# Patient Record
Sex: FEMALE | Race: AMERICAN INDIAN OR ALASKA NATIVE | Employment: PART TIME | URBAN - METROPOLITAN AREA
[De-identification: names, ages, dates, MRNs, and addresses within clinical notes are randomized per-mention and may not be internally consistent; named-entity substitution may affect disease eponyms.]

---

## 2018-11-20 ENCOUNTER — OFFICE VISIT (OUTPATIENT)
Dept: ORTHOPEDIC SURGERY | Age: 23
End: 2018-11-20

## 2018-11-20 VITALS
HEIGHT: 63 IN | WEIGHT: 144 LBS | TEMPERATURE: 96.9 F | DIASTOLIC BLOOD PRESSURE: 69 MMHG | SYSTOLIC BLOOD PRESSURE: 112 MMHG | BODY MASS INDEX: 25.52 KG/M2 | RESPIRATION RATE: 16 BRPM | HEART RATE: 76 BPM | OXYGEN SATURATION: 100 %

## 2018-11-20 DIAGNOSIS — M25.561 RIGHT KNEE PAIN, UNSPECIFIED CHRONICITY: ICD-10-CM

## 2018-11-20 DIAGNOSIS — M76.52 PATELLAR TENDINITIS OF BOTH KNEES: Primary | ICD-10-CM

## 2018-11-20 DIAGNOSIS — M76.51 PATELLAR TENDINITIS OF BOTH KNEES: Primary | ICD-10-CM

## 2018-11-20 DIAGNOSIS — M25.562 LEFT KNEE PAIN, UNSPECIFIED CHRONICITY: ICD-10-CM

## 2018-11-20 RX ORDER — MELOXICAM 15 MG/1
15 TABLET ORAL
Qty: 30 TAB | Refills: 1 | Status: SHIPPED | OUTPATIENT
Start: 2018-11-20 | End: 2019-01-17 | Stop reason: SDUPTHER

## 2018-11-20 NOTE — PROGRESS NOTES
Damaris Buchanan 1995 Chief Complaint Patient presents with  Knee Pain B/L knee pain HISTORY OF PRESENT ILLNESS 
Damaris Buchanan is a 26 y.o. female who presents today for evaluation of b/l knee pain. She rates her pain 0/10 today. Pain has been present for awhile. She used to participate in marching band, and had pain with squatting and kneeling. She had a h/o of hip pain. She participates on a dance team and had soreness after activity. Patient describes the pain as aching that is Intermittent in nature. Symptoms are worse with prolonged sitting, squatting, steps, walking and standing, Activity and is better with  Rest. Associated symptoms include stiffness. Since problem started, it: has improved. Pain does not wake patient up at night. Has taken NSAIDS for the problem. Has tried following treatments: Injections:NO; Brace:NO; Therapy:NO; Cane/Crutch:NO Allergies Allergen Reactions  Latex, Natural Rubber Rash History reviewed. No pertinent past medical history. Social History Socioeconomic History  Marital status: SINGLE Spouse name: Not on file  Number of children: Not on file  Years of education: Not on file  Highest education level: Not on file Social Needs  Financial resource strain: Not on file  Food insecurity - worry: Not on file  Food insecurity - inability: Not on file  Transportation needs - medical: Not on file  Transportation needs - non-medical: Not on file Occupational History  Not on file Tobacco Use  Smoking status: Never Smoker  Smokeless tobacco: Never Used Substance and Sexual Activity  Alcohol use: No  
 Drug use: No  
 Sexual activity: Not Currently Other Topics Concern  Not on file Social History Narrative  Not on file Past Surgical History:  
Procedure Laterality Date  HX TONSIL AND ADENOIDECTOMY History reviewed. No pertinent family history. Current Outpatient Medications Medication Sig  ibuprofen (MOTRIN) 600 mg tablet Take 1 Tab by mouth every six (6) hours as needed for Pain.  methocarbamol (ROBAXIN-750) 750 mg tablet Take 1 Tab by mouth six (6) times daily. No current facility-administered medications for this visit. REVIEW OF SYSTEM Patient denies: Weight loss, Fever/Chills, HA, Visual changes, Fatigue, Chest pain, SOB, Abdominal pain, N/V/D/C, Blood in stool or urine, Edema. Pertinent positive as above in HPI. All others were negative PHYSICAL EXAM:  
Visit Vitals /69 (BP 1 Location: Left arm, BP Patient Position: Sitting) Pulse 76 Temp 96.9 °F (36.1 °C) (Oral) Resp 16 Ht 5' 3\" (1.6 m) Wt 144 lb (65.3 kg) LMP 11/06/2018 (Exact Date) SpO2 100% BMI 25.51 kg/m² The patient is a well-developed, well-nourished female   in no acute distress. The patient is alert and oriented times three. The patient is alert and oriented times three. Mood and affect are normal. 
LYMPHATIC: lymph nodes are not enlarged and are within normal limits SKIN: normal in color and non tender to palpation. There are no bruises or abrasions noted. NEUROLOGICAL: Motor sensory exam is within normal limits. Reflexes are equal bilaterally. There is normal sensation to pinprick and light touch MUSCULOSKELETAL: 
Examination Left knee Right knee Skin Intact Intact Range of motion 0-130 0-130 Effusion - - Medial joint line tenderness - - Lateral joint line tenderness - - Tenderness Pes Bursa - - Tenderness insertion MCL - - Tenderness insertion LCL - - Jessicas - - Patella crepitus - - Patella grind - - Lachman - - Pivot shift - - Anterior drawer - - Posterior drawer - - Varus stress - -  
Valgus stress - - Neurovascular Intact Intact Calf Swelling and Tenderness to Palpation - - Dennys's Test - -  
Hamstring Cord Tightness + + IMAGING: XR of b/l knees dated 11/20/18 was reviewed and read: No acute abnormalities IMPRESSION:   
  ICD-10-CM ICD-9-CM 1. Patellar tendinitis of both knees M76.51 726.64 meloxicam (MOBIC) 15 mg tablet M76.52 2. Left knee pain, unspecified chronicity M25.562 719.46 AMB POC XRAY, KNEE; 1/2 VIEWS  
   meloxicam (MOBIC) 15 mg tablet 3. Right knee pain, unspecified chronicity M25.561 719.46 AMB POC XRAY, KNEE; 1/2 VIEWS  
   meloxicam (MOBIC) 15 mg tablet PLAN: 
1. The patient presents today with b/l knee pain due to patellar tendonitis of both knees. Given hamstring stretches today. Risk factors include: n/a 2. No ultrasound exam indicated today 3. No cortisone injection indicated today 4. No Physical/Occupational Therapy indicated today 5. No diagnostic test indicated today: 6. No durable medical equipment indicated today 7. No referral indicated today 8. Yes medications indicated today: MOBIC 9. No Narcotic indicated today RTC prn Follow-up Disposition: Not on File Scribed by Johnny Peres ([address]) as dictated by Jonathon Chavez MD 
 
I, Dr. Jonathon Chavez, confirm that all documentation is accurate.  
 
Jonathon Chavez M.D.  
[facility]

## 2019-01-17 DIAGNOSIS — M76.52 PATELLAR TENDINITIS OF BOTH KNEES: ICD-10-CM

## 2019-01-17 DIAGNOSIS — M76.51 PATELLAR TENDINITIS OF BOTH KNEES: ICD-10-CM

## 2019-01-17 DIAGNOSIS — M25.562 LEFT KNEE PAIN, UNSPECIFIED CHRONICITY: ICD-10-CM

## 2019-01-17 DIAGNOSIS — M25.561 RIGHT KNEE PAIN, UNSPECIFIED CHRONICITY: ICD-10-CM

## 2019-01-18 RX ORDER — MELOXICAM 15 MG/1
TABLET ORAL
Qty: 30 TAB | Refills: 1 | Status: SHIPPED | OUTPATIENT
Start: 2019-01-18 | End: 2019-02-21

## 2021-06-08 PROBLEM — M94.0 COSTAL CHONDRITIS: Status: ACTIVE | Noted: 2018-11-21

## 2021-06-08 PROBLEM — N63.0 BREAST LUMP: Status: ACTIVE | Noted: 2021-06-08

## 2021-06-08 PROBLEM — R92.8 ABNORMAL FINDINGS ON DIAGNOSTIC IMAGING OF BREAST: Status: ACTIVE | Noted: 2021-06-08

## 2021-11-02 ENCOUNTER — OFFICE VISIT (OUTPATIENT)
Dept: CARDIOLOGY CLINIC | Age: 26
End: 2021-11-02
Payer: COMMERCIAL

## 2021-11-02 VITALS
WEIGHT: 165 LBS | BODY MASS INDEX: 28.17 KG/M2 | HEART RATE: 65 BPM | OXYGEN SATURATION: 100 % | HEIGHT: 64 IN | DIASTOLIC BLOOD PRESSURE: 86 MMHG | SYSTOLIC BLOOD PRESSURE: 122 MMHG

## 2021-11-02 DIAGNOSIS — R42 DIZZINESS: ICD-10-CM

## 2021-11-02 DIAGNOSIS — R07.9 CHEST PAIN, UNSPECIFIED TYPE: Primary | ICD-10-CM

## 2021-11-02 PROCEDURE — 93000 ELECTROCARDIOGRAM COMPLETE: CPT | Performed by: INTERNAL MEDICINE

## 2021-11-02 PROCEDURE — 99204 OFFICE O/P NEW MOD 45 MIN: CPT | Performed by: INTERNAL MEDICINE

## 2021-11-02 NOTE — PROGRESS NOTES
Oma Feliciano presents today for   Chief Complaint   Patient presents with    Follow-up     self referred for chest pain     Chest Pain     feels like a chest tightness around her start. its is constant pain is more intense upon exertion        Laurie Lloyd preferred language for health care discussion is english/other. Is someone accompanying this pt? no    Is the patient using any DME equipment during [address]? no    Depression Screening:  3 most recent PHQ Screens 11/2/2021   Little interest or pleasure in doing things Not at all   Feeling down, depressed, irritable, or hopeless Not at all   Total Score PHQ 2 0       Learning Assessment:  Learning Assessment 11/2/2021   PRIMARY LEARNER Patient   HIGHEST LEVEL OF EDUCATION - PRIMARY LEARNER  4 YEARS OF COLLEGE   BARRIERS PRIMARY LEARNER NONE   PRIMARY LANGUAGE ENGLISH   LEARNER PREFERENCE PRIMARY DEMONSTRATION   ANSWERED BY patient   RELATIONSHIP SELF       Abuse Screening:  Abuse Screening Questionnaire 11/2/2021   Do you ever feel afraid of your partner? N   Are you in a relationship with someone who physically or mentally threatens you? N   Is it safe for you to go home? Y       Fall Risk  No flowsheet data found. Pt currently taking Anticoagulant therapy? no    Coordination of Care:  1. Have you been to the ER, urgent care clinic since your last visit? Hospitalized since your last visit? no    2. Have you seen or consulted any other health care providers outside of the [address] [place] [address] since your last visit? Include any pap smears or colon screening.  no

## 2021-11-02 NOTE — PROGRESS NOTES
HISTORY OF PRESENT ILLNESS  Adam Medrano is a 27 y.o. female. ASSESSMENT and PLAN    Ms. Lorena Holt has no prior documented history of CAD. She works as a  at [employer]. In June 2021, she had an episode of left-sided chest discomfort which was quite intense. The pain lasted about 3 days and resolved on its own. In October 2021, she had another episode of intense left-sided chest pain that lasted 3 days. It is also resolved on its own. There is no significant worsening with walking but there was worsening with other physical exertion like upper body movement. There is no associated dyspnea on exertion. She has no family history of early CAD. She denies knowledge of diabetes mellitus, hypertension, or hyperlipidemia. She drinks alcohol once every 1-2 weeks, usually wine. She denies tobacco use. She does smoke marijuana since the age of 23. From cardiac standpoint, she is doing well. Her chest pain is quite atypical for CAD, coronary ischemia. Nevertheless, echocardiogram has been requested to check anatomic definition. Regular stress test has also been requested. Her blood pressure is well controlled. There is no evidence of decompensated CHF noted. Her rhythm remains stable sinus. Her weight today is 165 pounds. If the above testing is unremarkable, I will see her back as needed. All questions were answered. She agrees with the plan. Encounter Diagnoses   Name Primary?  Chest pain, unspecified type Yes    Dizziness      current treatment plan is effective, no change in therapy  lab results and schedule of future lab studies reviewed with patient  reviewed diet, exercise and weight control  very strongly urged to quit smoking to reduce cardiovascular risk      HPI   Today, Ms. Lloyd has complaints of left-sided deep chest discomfort which is constant. She has had this pain since past Saturday, 3 days.   It is without significant changes throughout the day, whether she is walking or resting. She has noticed some worsening with moving her upper body. When she was seen in the emergency room on Saturday, ER doctor palpated the area and she noted worsening pain. She does not have the worsening pain at this time. Review of Systems   Respiratory: Negative for shortness of breath. Cardiovascular: Positive for chest pain. Negative for palpitations, orthopnea, claudication, leg swelling and PND. All other systems reviewed and are negative. Physical Exam  Vitals and nursing note reviewed. Constitutional:       Appearance: Normal appearance. HENT:      Head: Normocephalic. Eyes:      Conjunctiva/sclera: Conjunctivae normal.   Neck:      Vascular: No carotid bruit. Cardiovascular:      Rate and Rhythm: Normal rate and regular rhythm. Pulmonary:      Breath sounds: Normal breath sounds. Abdominal:      Palpations: Abdomen is soft. Musculoskeletal:         General: No swelling. Cervical back: No rigidity. Skin:     General: Skin is warm and dry. Neurological:      General: No focal deficit present. Mental Status: She is alert and oriented to person, place, and time.    Psychiatric:         Mood and Affect: Mood normal.         Behavior: Behavior normal.         PCP: Elmer Wiley MD    Past Medical History:   Diagnosis Date    Recurrent UTI     UTI (urinary tract infection)        Past Surgical History:   Procedure Laterality Date    HX PREMALIG/BENIGN SKIN LESION EXCISION      HX TONSIL AND ADENOIDECTOMY         Current Outpatient Medications   Medication Sig Dispense Refill    nitrofurantoin, macrocrystal-monohydrate, (MACROBID) 100 mg capsule TAKE 1 CAPSULE BY MOUTH AS NEEDED 30 Capsule 5    COVID-19 test specimen collect misc TEST AS DIRECTED      tretinoin (RETIN-A) 0.025 % topical cream          The patient has a family history of    Social History     Tobacco Use    Smoking status: Never Smoker    Smokeless tobacco: Never Used Vaping Use    Vaping Use: Never used   Substance Use Topics    Alcohol use: Yes    Drug use: Yes       No results found for: CHOL, CHOLX, CHLST, CHOLV, HDL, HDLP, LDL, LDLC, DLDLP, TGLX, TRIGL, TRIGP, CHHD, CHHDX     BP Readings from Last 3 Encounters:   11/02/21 122/86   02/05/20 120/80   08/07/19 100/70        Pulse Readings from Last 3 Encounters:   11/02/21 65   11/20/18 76   04/05/16 65       Wt Readings from Last 3 Encounters:   11/02/21 74.8 kg (165 lb)   06/08/21 66.7 kg (147 lb)   12/08/20 59.4 kg (131 lb)         EKG: normal EKG, normal sinus rhythm, unchanged from previous tracings.

## 2021-11-04 ENCOUNTER — TELEPHONE (OUTPATIENT)
Dept: CARDIOLOGY CLINIC | Age: 26
End: 2021-11-04

## 2021-12-01 ENCOUNTER — TELEPHONE (OUTPATIENT)
Dept: CARDIOLOGY CLINIC | Age: 26
End: 2021-12-01

## 2021-12-01 NOTE — TELEPHONE ENCOUNTER
----- Message from Celso Murphy MD sent at 11/30/2021  4:04 PM EST -----  Let the patient know that her stress test is normal.  ----- Message -----  From: Deborah Calhoun LPN  Sent: [id number]/[id number]   5:32 PM EST  To: Celso Murphy MD    Per your last note\" Ines Jones has no prior documented history of CAD. She works as a  at [employer]. In June 2021, she had an episode of left-sided chest discomfort which was quite intense. The pain lasted about 3 days and resolved on its own. In October 2021, she had another episode of intense left-sided chest pain that lasted 3 days. It is also resolved on its own. There is no significant worsening with walking but there was worsening with other physical exertion like upper body movement. There is no associated dyspnea on exertion. She has no family history of early CAD. She denies knowledge of diabetes mellitus, hypertension, or hyperlipidemia. She drinks alcohol once every 1-2 weeks, usually wine. She denies tobacco use. She does smoke marijuana since the age of 23. From cardiac standpoint, she is doing well. Her chest pain is quite atypical for CAD, coronary ischemia. Nevertheless, echocardiogram has been requested to check anatomic definition. Regular stress test has also been requested. Her blood pressure is well controlled. There is no evidence of decompensated CHF noted. Her rhythm remains stable sinus. Her weight today is 165 pounds.   If the above testing is unremarkable, I will see her back as needed